# Patient Record
Sex: MALE | Race: WHITE | Employment: STUDENT | URBAN - METROPOLITAN AREA
[De-identification: names, ages, dates, MRNs, and addresses within clinical notes are randomized per-mention and may not be internally consistent; named-entity substitution may affect disease eponyms.]

---
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
## 2020-05-05 ENCOUNTER — VIRTUAL VISIT (OUTPATIENT)
Dept: FAMILY MEDICINE | Facility: OTHER | Age: 20
End: 2020-05-05

## 2020-05-05 NOTE — PROGRESS NOTES
"Date: 2020 14:03:57  Clinician: Vahid Murcia  Clinician NPI: [id number]  Patient: Justin Alexander  Patient : 2000  Patient Address: [address] [place] [address]  Patient Phone: [phone]  Visit Protocol: URI  Patient Summary:  Justin is a 20 year old ( : 2000 ) male who initiated a Visit for cold, sinus infection, or influenza. When asked the question \"Please sign me up to receive news, health information and promotions. \", Justin responded \"No\".    Justin states his symptoms started 1-2 days ago.   His symptoms consist of myalgia, a sore throat, a cough, malaise, and enlarged lymph nodes.   Symptom details     Cough: Justin coughs a few times an hour and his cough is not more bothersome at night. Phlegm comes into his throat when he coughs. He does not believe his cough is caused by post-nasal drip. The color of the phlegm is clear, white, and green.     Sore throat: Justin reports having moderate throat pain (4-6 on a 10 point pain scale), has exudate on his tonsils, and can swallow liquids. The lymph nodes in his neck are enlarged. A rash has not appeared on the skin since the sore throat started.      Justin denies having fever, rhinitis, facial pain or pressure, nasal congestion, vomiting, nausea, teeth pain, ageusia, anosmia, diarrhea, ear pain, headache, wheezing, and chills. He also denies having recent facial or sinus surgery in the past 60 days. He is not experiencing dyspnea.   Precipitating events  Justin is not sure if he has been exposed to someone with strep throat. He has not recently been exposed to someone with influenza. Justin has been in close contact with the following high risk individuals: people with asthma, heart disease or diabetes.   Pertinent COVID-19 (Coronavirus) information  Justin does not work or volunteer as healthcare worker or a  and does not work or volunteer in a healthcare facility.   He does not live with a healthcare worker.   " Justin has not had a close contact with a laboratory-confirmed COVID-19 patient within 14 days of symptom onset. He also has not had a close contact with a suspected COVID-19 patient within 14 days of symptom onset.   Pertinent medical history  Justin has taken an antibiotic medication in the past month. Antibiotic details as reported by the patient (free text): I took strep throat medication about a month ago. I do not recall the name.   Justin needs a return to work/school note.   Weight: 175 lbs   Justin does not smoke or use smokeless tobacco.   Additional information as reported by the patient (free text): I had strep throat and walking pneumonia about a month ago. Two days ago i woke up with a inflamed left tonsil and over the past few days it has not changed in pain and I have started to see white spots again.   Weight: 175 lbs    MEDICATIONS: No current medications, ALLERGIES: amoxicillin, Penicillins  Clinician Response:  Dear Justin,   I have sent in a prescription for clindamycin to be take three times a day for 10 days.&nbsp;   Additional Symptomatic Treatment Recommendations:   Push fluids  Get Plenty of rest  Tylenol to help with pain or fever  Salt water gargles or lozenges to help with sore throat  Humidified air can help with congestion.   Over the counter nasal spray such as Fluticasone or Afrin nasal spray to help with sinus congestion  Over the counter cough/cold medication such as Delsym, Dayquil, Nyquil, Mucinex DM, or store brand equivalents as needed.&nbsp;  Over the counter decongestant such as Sudafed as needed (if have high blood pressure ask pharmacist about decongestants that are safe with high blood pressure)  Can do a cool compress to the forehead or back of neck to help with headache   change out toothbrush 24 hours after starting antibiotics      If you would develop worsening pain, fevers, difficulty swallowing, difficulty breathing, headache, dizziness, lightheaded, or have any concerns then  present to the clinic for evaluation.&nbsp;        Diagnosis: Pain in throat  Diagnosis ICD: R07.0  Prescription: clindamycin HCl (Cleocin HCl) 300 mg oral capsule 30 capsule, 10 days supply. Take 1 capsule by mouth every 8 hours for 10 days. Refills: 0, Refill as needed: no, Allow substitutions: yes  Pharmacy: [id number] - [phone] - [address] [id number]

## 2020-08-11 ENCOUNTER — NURSE TRIAGE (OUTPATIENT)
Dept: NURSING | Facility: CLINIC | Age: 20
End: 2020-08-11

## 2020-08-11 NOTE — TELEPHONE ENCOUNTER
"    Additional Information    Negative: Passed out (i.e., fainted, collapsed and was not responding)    Negative: Shock suspected (e.g., cold/pale/clammy skin, too weak to stand, low BP, rapid pulse)    Negative: Sounds like a life-threatening emergency to the triager    Negative: Chest pain    Negative: Pain is mainly in upper abdomen (if needed ask: 'is it mainly above the belly button?')    Negative: SEVERE abdominal pain (e.g., excruciating)    Negative: Vomiting red blood or black (coffee ground) material    Negative: Bloody, black, or tarry bowel movements    Negative: Unable to urinate (or only a few drops) and bladder feels very full    Negative: Pain in scrotum persists > 1 hour    Negative: Constant abdominal pain lasting > 2 hours    Negative: Vomiting bile (green color)    Negative: Patient sounds very sick or weak to the triager    Negative: Vomiting and abdomen looks much more swollen than usual    Negative: White of the eyes have turned yellow (i.e., jaundice)    Negative: Blood in urine (red, pink, or tea-colored)    Negative: Fever > 103 F (39.4 C)    Negative: Fever > 101 F (38.3 C) and over 60 years of age    Negative: Fever > 100.0 F (37.8 C) and has diabetes mellitus or a weak immune system (e.g., HIV positive, cancer chemotherapy, organ transplant, splenectomy, chronic steroids)    Negative: Fever > 100.0 F (37.8 C) and bedridden (e.g., nursing home patient, stroke, chronic illness, recovering from surgery)    Mild pain that comes and goes (cramps) lasts > 24 hours    Answer Assessment - Initial Assessment Questions  1. LOCATION: \"Where does it hurt?\"       Lower mid  And to the left side  2. RADIATION: \"Does the pain shoot anywhere else?\" (e.g., chest, back)      no  3. ONSET: \"When did the pain begin?\" (Minutes, hours or days ago)       days  4. SUDDEN: \"Gradual or sudden onset?\"      gradual  5. PATTERN \"Does the pain come and go, or is it constant?\"     - If constant: \"Is it getting better, " "staying the same, or worsening?\"       (Note: Constant means the pain never goes away completely; most serious pain is constant and it progresses)      - If intermittent: \"How long does it last?\" \"Do you have pain now?\"      (Note: Intermittent means the pain goes away completely between bouts)      Constant pretty much  6. SEVERITY: \"How bad is the pain?\"  (e.g., Scale 1-10; mild, moderate, or severe)     - MILD (1-3): doesn't interfere with normal activities, abdomen soft and not tender to touch      - MODERATE (4-7): interferes with normal activities or awakens from sleep, tender to touch      - SEVERE (8-10): excruciating pain, doubled over, unable to do any normal activities        mild  7. RECURRENT SYMPTOM: \"Have you ever had this type of abdominal pain before?\" If so, ask: \"When was the last time?\" and \"What happened that time?\"       no  8. CAUSE: \"What do you think is causing the abdominal pain?\"      Not sure  9. RELIEVING/AGGRAVATING FACTORS: \"What makes it better or worse?\" (e.g., movement, antacids, bowel movement)      ?  10. OTHER SYMPTOMS: \"Has there been any vomiting, diarrhea, constipation, or urine problems?\"        diarrhea    Protocols used: ABDOMINAL PAIN - MALE-A-OH      "

## 2020-08-12 ENCOUNTER — OFFICE VISIT (OUTPATIENT)
Dept: FAMILY MEDICINE | Facility: CLINIC | Age: 20
End: 2020-08-12
Payer: COMMERCIAL

## 2020-08-12 VITALS
BODY MASS INDEX: 23.54 KG/M2 | RESPIRATION RATE: 16 BRPM | TEMPERATURE: 98.3 F | DIASTOLIC BLOOD PRESSURE: 78 MMHG | WEIGHT: 176 LBS | HEART RATE: 69 BPM | SYSTOLIC BLOOD PRESSURE: 137 MMHG

## 2020-08-12 DIAGNOSIS — R19.5 MUCOUS IN STOOLS: ICD-10-CM

## 2020-08-12 DIAGNOSIS — R10.84 ABDOMINAL PAIN, GENERALIZED: Primary | ICD-10-CM

## 2020-08-12 LAB
ANION GAP SERPL CALCULATED.3IONS-SCNC: 1 MMOL/L (ref 3–14)
BUN SERPL-MCNC: 13 MG/DL (ref 7–30)
CALCIUM SERPL-MCNC: 9.5 MG/DL (ref 8.5–10.1)
CHLORIDE SERPL-SCNC: 106 MMOL/L (ref 94–109)
CO2 SERPL-SCNC: 32 MMOL/L (ref 20–32)
CREAT SERPL-MCNC: 1.14 MG/DL (ref 0.66–1.25)
CRP SERPL-MCNC: <2.9 MG/L (ref 0–8)
ERYTHROCYTE [DISTWIDTH] IN BLOOD BY AUTOMATED COUNT: 11.6 % (ref 10–15)
ERYTHROCYTE [SEDIMENTATION RATE] IN BLOOD BY WESTERGREN METHOD: 3 MM/H (ref 0–15)
GFR SERPL CREATININE-BSD FRML MDRD: >90 ML/MIN/{1.73_M2}
GLUCOSE SERPL-MCNC: 93 MG/DL (ref 70–99)
HCT VFR BLD AUTO: 44.6 % (ref 40–53)
HGB BLD-MCNC: 15.6 G/DL (ref 13.3–17.7)
MCH RBC QN AUTO: 30.4 PG (ref 26.5–33)
MCHC RBC AUTO-ENTMCNC: 35 G/DL (ref 31.5–36.5)
MCV RBC AUTO: 87 FL (ref 78–100)
PLATELET # BLD AUTO: 215 10E9/L (ref 150–450)
POTASSIUM SERPL-SCNC: 4.5 MMOL/L (ref 3.4–5.3)
RBC # BLD AUTO: 5.14 10E12/L (ref 4.4–5.9)
SODIUM SERPL-SCNC: 139 MMOL/L (ref 133–144)
WBC # BLD AUTO: 6 10E9/L (ref 4–11)

## 2020-08-12 PROCEDURE — 86140 C-REACTIVE PROTEIN: CPT | Performed by: PHYSICIAN ASSISTANT

## 2020-08-12 PROCEDURE — 80048 BASIC METABOLIC PNL TOTAL CA: CPT | Performed by: PHYSICIAN ASSISTANT

## 2020-08-12 PROCEDURE — 36415 COLL VENOUS BLD VENIPUNCTURE: CPT | Performed by: PHYSICIAN ASSISTANT

## 2020-08-12 PROCEDURE — 85027 COMPLETE CBC AUTOMATED: CPT | Performed by: PHYSICIAN ASSISTANT

## 2020-08-12 PROCEDURE — 99213 OFFICE O/P EST LOW 20 MIN: CPT | Performed by: PHYSICIAN ASSISTANT

## 2020-08-12 PROCEDURE — 85652 RBC SED RATE AUTOMATED: CPT | Performed by: PHYSICIAN ASSISTANT

## 2020-08-12 ASSESSMENT — ENCOUNTER SYMPTOMS
HEMATOCHEZIA: 0
VOMITING: 0
DYSURIA: 0
LIGHT-HEADEDNESS: 0
CONSTIPATION: 0
ABDOMINAL PAIN: 1
NAUSEA: 1
HEMATURIA: 0
UNEXPECTED WEIGHT CHANGE: 0
NERVOUS/ANXIOUS: 0
COUGH: 0
DIARRHEA: 0
FEVER: 0
FATIGUE: 0
APPETITE CHANGE: 0
SHORTNESS OF BREATH: 0
ANAL BLEEDING: 1

## 2020-08-12 ASSESSMENT — PAIN SCALES - GENERAL: PAINLEVEL: MODERATE PAIN (4)

## 2020-08-12 NOTE — PROGRESS NOTES
Subjective     Justin Alexander is a 20 year old male who presents to clinic today for the following health issues:    HPI     Patient notes one week of abdominal pain that started suddenly and initially improved. Over the last three days the pain has become more constant and located to the lower abdomen. Sometimes pain migrates to the left aspect of the abdomen. Small amount of blood on the stools periodically. BM's are daily, no concerns for constipation. Nausea with BM at first which has resolved. One episode of diarrhea daily. 2-4 BM's daily with some stools being mucous only which has been present for around 4 days. No family history of Chron's or colitis. No abdominal surgeries. Patient was on antibiotics around one month ago for strep.    ABDOMINAL PAIN     Onset: 7 days    Description:   Character: Sharp and Dull ache   Location: mid- right lower quadrant  Radiation: None    Intensity: mild    Progression of Symptoms:  worsening and constant    Accompanying Signs & Symptoms:  Fever/Chills?: no   Gas/Bloating: no   Nausea: YES- intermittent  Vomitting: no   Diarrhea?: YES  Constipation:no   Dysuria or Hematuria: no    History:   Trauma: no   Previous similar pain: no    Previous tests done: none    Precipitating factors:   Does the pain change with:     Food: no- decreased     BM: YES    Urination: no     Alleviating factors:  None    Therapies Tried and outcome: None    LMP:  not applicable     Kayla Sauceda [facility]    Patient Active Problem List   Diagnosis     Cough variant asthma     Right ankle pain     Family history of hypercholesterolemia     Heart murmur     No past surgical history on file.    Social History     Tobacco Use     Smoking status: Never Smoker     Smokeless tobacco: Never Used   Substance Use Topics     Alcohol use: No     Family History   Problem Relation Age of Onset     Asthma Father      Allergies Father      Gastrointestinal Disease Father      Asthma Brother      Lipids Mother       Allergies Maternal Grandmother      Hypertension Maternal Grandmother      Diabetes Maternal Grandmother      Eye Disorder Maternal Grandmother      Lipids Maternal Grandmother      Diabetes Maternal Grandfather      Blood Disease Paternal Grandmother      Breast Cancer Paternal Grandmother      Gastrointestinal Disease Paternal Grandmother      Gynecology Paternal Grandmother      Lipids Paternal Grandmother      C.A.D. Paternal Grandfather      Lipids Paternal Grandfather          Current Outpatient Medications   Medication Sig Dispense Refill     naproxen (NAPROSYN) 500 MG tablet Take 1 pill twice per day with food for1 week then every 12 hours as needed (Patient not taking: Reported on 8/12/2020) 30 tablet 0     Allergies   Allergen Reactions     Amoxicillin Rash     Sulfa Drugs      Upset stomach       Reviewed and updated as needed this visit by Provider         Review of Systems   Constitutional: Negative for appetite change, fatigue, fever and unexpected weight change.   HENT: Negative for congestion.    Respiratory: Negative for cough and shortness of breath.    Cardiovascular: Negative for chest pain.   Gastrointestinal: Positive for abdominal pain, anal bleeding and nausea. Negative for constipation, diarrhea, hematochezia and vomiting.   Genitourinary: Negative for discharge, dysuria and hematuria.   Skin: Negative for rash.   Neurological: Negative for light-headedness.   Psychiatric/Behavioral: The patient is not nervous/anxious.             Objective    /78   Pulse 69   Temp 98.3  F (36.8  C) (Tympanic)   Resp 16   Wt 79.8 kg (176 lb)   BMI 23.54 kg/m    Body mass index is 23.54 kg/m .  Physical Exam  Vitals signs and nursing note reviewed.   Constitutional:       General: He is not in acute distress.     Appearance: Normal appearance.   HENT:      Head: Normocephalic and atraumatic.   Eyes:      Extraocular Movements: Extraocular movements intact.      Pupils: Pupils are equal, round, and  reactive to light.   Neck:      Musculoskeletal: Normal range of motion.   Cardiovascular:      Rate and Rhythm: Normal rate and regular rhythm.      Heart sounds: Normal heart sounds.   Pulmonary:      Effort: Pulmonary effort is normal.      Breath sounds: Normal breath sounds.   Abdominal:      General: Bowel sounds are normal.      Palpations: Abdomen is soft.      Tenderness: There is no abdominal tenderness. There is no right CVA tenderness, left CVA tenderness, guarding or rebound.   Musculoskeletal: Normal range of motion.   Skin:     General: Skin is warm and dry.   Neurological:      General: No focal deficit present.      Mental Status: He is alert.   Psychiatric:         Mood and Affect: Mood normal.         Behavior: Behavior normal.            Diagnostic Test Results:  Labs reviewed in Epic  CBC, BMP, ESR, CRP pending        Assessment & Plan     1. Abdominal pain, generalized  2. Mucous in stools  Low suspicion for infection or acute abdomen as vital signs are normal and there is no tenderness, rebound, or guarding with exam.  Symptoms may be due to viral GI illness.  Will evaluate for possible inflammatory bowel concerns with blood work.  We will contact patient with results and next steps.  Recommended follow-up in 1 week if symptoms are not improving, or sooner if symptoms are worsening.  - CBC with platelets  - Basic metabolic panel  (Ca, Cl, CO2, Creat, Gluc, K, Na, BUN)  - ESR: Erythrocyte sedimentation rate  - CRP, inflammation    See Patient Instructions    Return in about 1 week (around 8/19/2020), or if symptoms worsen or fail to improve.    Yoselin Victoria PA-C  [facility]

## 2020-08-12 NOTE — PATIENT INSTRUCTIONS
-Start a probiotic supplement daily  -We will complete blood work today and [facility] If we need to do a CT, I will call you.   -Follow up in 1 week if symptoms are not improving, or sooner if worsening       Patient Education     Abdominal Pain  Abdominal pain is pain in the stomach or belly area. Everyone has this pain from time to time. In many cases it goes away on its own. But abdominal pain can sometimes be due to a serious problem, such as appendicitis. So it s important to know when to get help.  Causes of abdominal pain  There are many possible causes of abdominal pain. Common causes in adults include:    Constipation, diarrhea, or gas    Stomach acid flowing back up into the esophagus (acid reflux or heartburn)    Severe acid reflux, called GERD (gastroesophageal reflux disease)    A sore in the lining of the stomach or small intestine (peptic ulcer)    Inflammation of the gallbladder, liver, or pancreas    Gallstones or kidney stones    Appendicitis     Intestinal blockage     An internal organ pushing through a muscle or other tissue (hernia)    Urinary tract infections    In women, menstrual cramps, fibroids, ovarian cysts, pelvic inflammatory disease, or endometriosis    Inflammation or infection of the intestines, including Crohn's disease and ulcerative colitis    Irritable bowel syndrome  Diagnosing the cause of abdominal pain  Your healthcare provider will give you a physical exam help find the cause of your pain. If needed, you will have tests. Belly pain has many possible causes. So it can be hard to find the reason for your pain. Giving details about your pain can help. Tell your provider where and when you feel the pain, and what makes it better or worse. Also let your provider know if you have other symptoms such as:    Fever    Tiredness    Upset stomach (nausea)    Vomiting    Changes in bathroom habits    Blood in the stool or black, tarry stool    Weight  loss that you can't explain (involuntary weight loss?)  Also report any family history of stomach or intestinal problems, or cancers. Tell your provider about all your alcohol use and drug use. Tell your provider about all medicines you use, including herbs, vitamins, and supplements.   Treating abdominal pain  Some causes of pain need emergency medical treatment right away. These include appendicitis or a bowel blockage. Other problems can be treated with rest, fluids, or medicines. Your healthcare provider can give you specific instructions for treatment or self-care based on what is causing your pain.     If you have vomiting or diarrhea, sip water or other clear fluids. When you are ready to eat solid foods again, start with small amounts of easy-to-digest, low-fat foods. These include apple sauce, toast, or crackers.  When to get medical care  Call 911 or go to the hospital right away if you:    Can t pass stool and are vomiting    Are vomiting blood or have bloody diarrhea or black, tarry diarrhea    Have chest, neck, or shoulder pain    Feel like you might pass out    Have pain in your shoulder blades with nausea    Have sudden, severe belly pain    Have new, severe pain unlike any you have felt before    Have a belly that is rigid, hard, and hurts to touch  Call your healthcare provider if you have:    Pain for more than 5 days    Bloating for more than 2 days    Diarrhea for more than 5 days    A fever of 100.4 F (38 C) or higher, or as directed by your healthcare provider    Pain that gets worse    Weight loss for no reason    Continued lack of appetite    Blood in your stool  How to prevent abdominal pain  Here are some tips to help prevent abdominal pain:    Eat smaller amounts of food at each meal.    Don't eat greasy, fried, or other high-fat foods.    Don't eat foods that give you gas.    Exercise regularly.    Drink plenty of fluids.  To help prevent GERD symptoms:    Quit smoking.    Reduce alcohol  and foods that increase stomach acid.    Don't use aspirin or over-the-counter pain and fever medicines, if possible. This includes nonsteroidal anti-inflammatory drugs (NSAIDs).    Lose excess weight.    Finish eating at least 2 hours before you go to bed or lie down.    Raise the head of your bed.  Date Last Reviewed: 7/1/2016 2000-2019 [employer]. [address] [place] [address], [place] [address]. All rights reserved. This information is not intended as a substitute for professional medical care. Always follow your healthcare professional's instructions.

## 2020-11-16 ENCOUNTER — HEALTH MAINTENANCE LETTER (OUTPATIENT)
Age: 20
End: 2020-11-16

## 2021-09-18 ENCOUNTER — HEALTH MAINTENANCE LETTER (OUTPATIENT)
Age: 21
End: 2021-09-18

## 2022-01-08 ENCOUNTER — HEALTH MAINTENANCE LETTER (OUTPATIENT)
Age: 22
End: 2022-01-08

## 2022-05-08 NOTE — PROGRESS NOTES
SUBJECTIVE:                                                    Justin Alexander is a 17 year old male, here for a routine health maintenance visit,   accompanied by his self.    Patient was roomed by: Hermila Houston MA    Do you have any forms to be completed?  no    SOCIAL HISTORY  Family members in house: mother, father and brother  Language(s) spoken at home: English  Recent family changes/social stressors: none noted    SAFETY/HEALTH RISKS  TB exposure:  No  Cardiac risk assessment: none    DENTAL  Dental health HIGH risk factors: none  Water source:  city water    SPORTS QUESTIONNAIRE:  ======================   School: [employer]                          thGthrthathdtheth:th th1th1th Sports: football  1. YES - Has a doctor ever denied or restricted your participation in sports for any reason or told you to give up sports?   Head injury 4 years ago  2. no - Do you have an ongoing medical condition (like diabetes,asthma, anemia, infections)?   3. no - Are you currently taking any prescription or nonprescription (over-the-counter) medicines or pills?    4. YES - Do you have allergies to medicines, pollens, foods or stinging insects?  pcn and amox  5. no - Have you ever spent the night in a hospital?  6. no - Have you ever had surgery?   7. no - Have you ever passed out or nearly passed out DURING exercise?  8. no - Have you ever passed out or nearly passed out AFTER exercise?  9. no -Have you ever had discomfort, pain, tightness, or pressure in your chest during exercise?  10. no -Does your heart race or skip beats (irregular beats) during exercise?   11. no -Has a doctor ever told you that you have ;high blood pressure, a heart murmur, high cholesterol,a heart infection, Rheumatic fever, Kawasaki's Disease?  12 YES -Has a doctor ever ordered a test for your heart? (example, ECG/EKG, Echocardiogram, stress test)  ekg due to family heart history  13. no -Do you ever get lightheaded or feel more short of breath than  expected during exercise?   14. no-Have you ever had an unexplained seizure?   15. no - Do you get more tired or short of breath more quickly than your friends during exercise?   16. no - Has any family member or relative  of heart problems or had an unexpected or unexplained sudden death before age 50 (including unexplained drowning, unexplained car accident or sudden infant death syndrome)?  17. no - Does anyone in your family have hypertrophic cardiomyopathy, Marfan Syndrome, arrhythmogenic right ventricular cardiomyopathy, long QT syndrome, short QT syndrome, Brugada syndrome, or catecholaminergic polymorphic ventricular tachycardia?    18. YES - Does anyone in your family have a heart problem, pacemaker, or implanted defibrillator? Father and paternal grandfather heart failure  19. no -Has anyone in your family had unexplained fainting, unexplained seizures, or near drowning?   20. YES - Have you ever had an injury, like a sprain, muscle or ligament tear or tendonitis, that caused you to miss a practice or game?  Head injury  21. no - Have you had any broken or fractured bones, or dislocated joints?   22. YES - Have you had an injury that required x-rays, MRI, CT, surgery, injections, therapy, a brace, a cast, or crutches? Head injury  23. no - Have you ever had a stress fracture?   24. no - Have you ever been told that you have or have you had an x-ray for neck instability or atlantoaxial instability? (Down syndrome or dwarfism)  25. no - Do you regularly use a brace, orthotics or assistive device?    26. YES -Do you have a bone,muscle, or joint injury that bothers you? Bilateral shoulder pain  27. NO- Do any of your joints become painful, swollen, feel warm or look red?   28. no -Do you have any history of juvenile arthritis or connective tissue disease?   29. YES - Has a doctor ever told you that you have asthma or allergies? --asthma during middle school   30. no - Do you cough, wheeze, have chest  tightness, or have difficulty breathing during or after exercise?    31. no - Is there anyone in your family who has asthma?    32. YES - Have you ever used an inhaler or taken asthma medicine?  Inhaler during middle  33. no - Do you develop a rash or hives when you exercise?   34. no - Were you born without or are you missing a kidney, an eye, a testicle (males), or any other organ?  35. no- Do you have groin pain or a painful bulge or hernia in the groin area?   36. no - Have you had infectious mononucleosis (mono) within the last month?   37. no - Do you have any rashes, pressure sores, or other skin problems?   38. no - Have you had a herpes or MRSA skin infection?    39. YES - Have you ever had a head injury or concussion?  concussion  40. YES - Have you ever had a hit or blow in the head that caused confusion, prolonged headaches, or memory problems?  concussion  41. no - Do you have a history of seizure disorder?    42. no - Do you have headaches with exercise?   43. no - Have you ever had numbness, tingling or weakness in your arms or legs after being hit or falling?   44. no - Have you ever been unable to move your arms or legs after being hit or falling?   45. no -Have you ever become ill while exercising in the heat?  46. no -Do you get frequent muscle cramps when exercising?  47. no - Do you or someone in your family have sickle cell trait or disease?    48. no - Have you had any problems with your eyes or vision?   49. no - Have you had any eye injuries?   50. no - Do you wear glasses or contact lenses?    51. no - Do you wear protective eyewear, such as goggles or a face shield?  52. no- Do you worry about your weight?    53. YES - Are you trying to or has anyone recommended that you gain or lose weight?  Trying to lose weight  54. YES - Are you on a special diet or do you avoid certain types of foods?trying to lose weight  55. no- Have you ever had an eating disorder?   56. no - Do you have any  concerns that you would like to discuss with a doctor?      VISION:  Testing not done; patient has seen eye doctor in the past 12 months.    HEARING:  Testing not done, normal hearing test last year, no current hearing concerns.    QUESTIONS/CONCERNS: None    SAFETY  Car seat belt always worn:  Yes  Helmet worn for bicycle/roller blades/skateboard?  Yes  Guns/firearms in the home: No    ELECTRONIC MEDIA  TV in bedroom: No  < 2 hours/ day    EDUCATION  School:  [employer]  thGthrthathdtheth:th th1th1th School performance / Academic skills: doing well in school  Days of school missed: 5 or fewer  Concerns: no    ACTIVITIES  Do you get at least 60 minutes per day of physical activity, including time in and out of school: Yes  Extra-curricular activities:   Organized / team sports:  none    DIET  Do you get at least 4 helpings of a fruit or vegetable every day: Yes  How many servings of juice, non-diet soda, punch or sports drinks per day: 0    SLEEP  No concerns, sleeps well through night    ============================================================    PROBLEM LIST  Patient Active Problem List   Diagnosis     Cough variant asthma     Right ankle pain     Family history of hypercholesterolemia     MEDICATIONS  Current Outpatient Prescriptions   Medication Sig Dispense Refill     NO ACTIVE MEDICATIONS         ALLERGY  Allergies   Allergen Reactions     Amoxicillin Rash     Sulfa Drugs      Upset stomach       IMMUNIZATIONS  Immunization History   Administered Date(s) Administered     Comvax (HIB/HepB) 2000, 2000, 03/19/2001     DTAP (<7y) 2000, 2000, 2000, 09/17/2001, 07/08/2005     HIB 2000     HPVQuadrivalent 07/29/2014     Hepatitis A Vac Ped/Adol-2 Dose 07/29/2014     MMR 07/08/2005     Measles 03/19/2001, 04/19/2004     Meningococcal (Menactra ) 07/29/2014     Poliovirus, inactivated (IPV) 2000, 2000, 09/17/2001, 07/08/2005     Rubella 09/17/2001     TDAP Vaccine (Adacel)  08/01/2011     Varicella 03/19/2001, 08/01/2011       HEALTH HISTORY SINCE LAST VISIT  No surgery, major illness or injury since last physical exam    DRUGS  Smoking:  no  Passive smoke exposure:  no  Alcohol:  no  Drugs:  no      PSYCHO-SOCIAL/DEPRESSION    No concerns      ROS  GENERAL: See health history, nutrition and daily activities   SKIN: No  rash, hives or significant lesions  HEENT: Hearing/vision: see above.  No eye, nasal, ear symptoms.  RESP: No cough or other concerns  CV: No concerns  GI: See nutrition and elimination.  No concerns.  : See elimination. No concerns  NEURO: No headaches or concerns.    OBJECTIVE:                                                    EXAMThere were no vitals taken for this visit.  No height on file for this encounter.  No weight on file for this encounter.  No height and weight on file for this encounter.  No blood pressure reading on file for this encounter.  GENERAL: Active, alert, in no acute distress.  SKIN: Clear. No significant rash, abnormal pigmentation or lesions  HEAD: Normocephalic  EYES: Pupils equal, round, reactive, Extraocular muscles intact. Normal conjunctivae.  EARS: Normal canals. Tympanic membranes are normal; gray and translucent.  NOSE: Normal without discharge.  MOUTH/THROAT: Clear. No oral lesions. Teeth without obvious abnormalities.  NECK: Supple, no masses.  No thyromegaly.  LYMPH NODES: No adenopathy  LUNGS: Clear. No rales, rhonchi, wheezing or retractions  HEART: Regular rhythm. Normal S1/S2. No murmurs. Normal pulses.  ABDOMEN: Soft, non-tender, not distended, no masses or hepatosplenomegaly. Bowel sounds normal.   NEUROLOGIC: No focal findings. Cranial nerves grossly intact: DTR's normal. Normal gait, strength and tone  BACK: Spine is straight, no scoliosis.  EXTREMITIES: Full range of motion, no deformities  -M: Normal male external genitalia. Salty stage 5,  both testes descended, no hernia.    SPORTS EXAM:        Shoulder:  normal     Elbow:  normal    Hand/Wrist:  normal    Back:  normal    Quad/Ham:  normal    Knee:  normal    Ankle/Feet:  normal    Heel/Toe:  normal    Duck walk:  normal    ASSESSMENT/PLAN:                                                        ICD-10-CM    1. Encounter for routine child health examination w/o abnormal findings Z00.129 BEHAVIORAL / EMOTIONAL ASSESSMENT [[id number]]     HUMAN PAPILLOMA VIRUS (GARDASIL 9) VACCINE     VACCINE ADMINISTRATION, INITIAL       Anticipatory Guidance  Reviewed Anticipatory Guidance in patient instructions    Preventive Care Plan  Immunizations    [facility]  I reviewed the signs and symptoms of adverse effects and when to seek medical care if they should arise.  Referrals/Ongoing Specialty care: No   See other orders in EpicCare.  Cleared for sports:  Yes  BMI at No height and weight on file for this encounter.  No weight concerns.  Dental visit recommended: Yes, Continue care every 6 months    FOLLOW-UP:    in 1-2 years for a Preventive Care visit    Resources  HPV and Cancer Prevention:  What Parents Should Know  What Kids Should Know About HPV and Cancer  Goal Tracker: Be More Active  Goal Tracker: Less Screen Time  Goal Tracker: Drink More Water  Goal Tracker: Eat More Fruits and Veggies    Daniel Melendez PA-C  [facility]   08-May-2018

## 2022-11-19 ENCOUNTER — HEALTH MAINTENANCE LETTER (OUTPATIENT)
Age: 22
End: 2022-11-19

## 2023-04-15 ENCOUNTER — HEALTH MAINTENANCE LETTER (OUTPATIENT)
Age: 23
End: 2023-04-15